# Patient Record
(demographics unavailable — no encounter records)

---

## 2025-02-21 NOTE — HISTORY OF PRESENT ILLNESS
[Disease: _____________________] : Disease: [unfilled] [T: ___] : T[unfilled] [N: ___] : N[unfilled] [AJCC Stage: ____] : AJCC Stage: [unfilled] [de-identified] : 64-year-old male smoker, history of polysubstance abuse, presenting for oncology consultation for lung carcinoma -self-referred.    12/5/2024-Admitted to [facility] with drug overdose, respiratory failure. Foung lung mass on imaging (eventually left AMA).   12/5/2024 -CT chest-pulmonary neoplasm/malignancy involving the right lower lobe with intrathoracic lymph node metastasis.  Enlarged right superior and inferior right hilar lymph nodes the largest measuring 1.8 cm.  Enlarged subcarinal lymph node measuring 3.9 cm.  No left hilar lymphadenopathy or supraclavicular lymphadenopathy.  Soft tissue mass in the superior segment of the right lower lobe with occlusion of the superior segment right lower lobe bronchus measuring 7 cm.  Pleural-based calcification due to prior asbestos exposure.  Scarring in the left lower lobe.  Visualized upper abdomen unremarkable.  1/2/2025-CT guided right lower lobe lung mass biopsy-non-small cell carcinoma, favor adenocarcinoma of lung primary.  Neoplastic cells are positive for CK7, TTF-1 1 and p63.  Negative for Napsin A, chromogranin and synaptophysin.  Ki-67 40%.  PD-L1 negative (less than 1%). Insufficient tissue for NGS study.  2/5/2025-PET/CT scan- 2/14/2025-MRI Saw pulmonologist?? Patient currently   [de-identified] : NSCLC, favor Adenocarcinoma [de-identified] : PD-L1 <1%, negative

## 2025-02-21 NOTE — HISTORY OF PRESENT ILLNESS
[Disease: _____________________] : Disease: [unfilled] [T: ___] : T[unfilled] [N: ___] : N[unfilled] [AJCC Stage: ____] : AJCC Stage: [unfilled] [de-identified] : 64-year-old male smoker, history of polysubstance abuse, presenting for oncology consultation for lung carcinoma -self-referred.    12/5/2024-Admitted to [facility] with drug overdose, respiratory failure. Foung lung mass on imaging (eventually left AMA).   12/5/2024 -CT chest-pulmonary neoplasm/malignancy involving the right lower lobe with intrathoracic lymph node metastasis.  Enlarged right superior and inferior right hilar lymph nodes the largest measuring 1.8 cm.  Enlarged subcarinal lymph node measuring 3.9 cm.  No left hilar lymphadenopathy or supraclavicular lymphadenopathy.  Soft tissue mass in the superior segment of the right lower lobe with occlusion of the superior segment right lower lobe bronchus measuring 7 cm.  Pleural-based calcification due to prior asbestos exposure.  Scarring in the left lower lobe.  Visualized upper abdomen unremarkable.  1/2/2025-CT guided right lower lobe lung mass biopsy-non-small cell carcinoma, favor adenocarcinoma of lung primary.  Neoplastic cells are positive for CK7, TTF-1 1 and p63.  Negative for Napsin A, chromogranin and synaptophysin.  Ki-67 40%.  PD-L1 negative (less than 1%). Insufficient tissue for NGS study.  2/5/2025-PET/CT scan- 2/14/2025-MRI Saw pulmonologist?? Patient currently   [de-identified] : NSCLC, favor Adenocarcinoma [de-identified] : PD-L1 <1%, negative

## 2025-02-21 NOTE — ASSESSMENT
[FreeTextEntry1] : [facility] records, CT chest report, lung biopsy pathology report reviewed. Clinically Stage IIIB (T3N2) TIGHT lung NSCLC, favor adenocarcinoma. -- With information we currently have, reviewed diagnosis and treatment options. --Discussed roles of surgery/radiation/systemic therapy in lung cancer management. --PET/CT scan pending --Brain MRI pending --Obtain lab work --Thoracic surgical referral-to consider further mediastinal evaluation --Radiation oncology consultation --Obtain NGS-if insufficient tissue from recent biopsy, to consider liquid biopsy and/or attempting testing on further tissue which may be obtained -- With the information we currently have, discussed options such as perioperative systemic therapy with reevaluation for surgery following neoadjuvant treatment, versus definitive concurrent chemoradiation if patient is deemed not to be a surgical candidate.  Expressed my concern regarding his polysubstance abuse with potential complications including death if ongoing use while we are instituting cancer treatment.  Emphasized importance of compliance with follow-up and recommendations.  Patient -- Palliative care referral-to assist with symptom management during course considering patient's history of drug abuse as well. --have pathology slides reviewed by [facility]  Patient was given the opportunity to ask questions. His questions were answered to his apparent satisfaction at this time.  -->RTO post thoracic surgery and radiation oncology consultations/input.

## 2025-02-21 NOTE — ASSESSMENT
[FreeTextEntry1] : [facility] records, CT chest report, lung biopsy pathology report reviewed. Clinically Stage IIIB (T3N2) TIGHT lung NSCLC, favor adenocarcinoma. -- With information we currently have, reviewed diagnosis and treatment options. --Discussed roles of surgery/radiation/systemic therapy in lung cancer management. --PET/CT scan pending --Brain MRI pending --Obtain lab work --Thoracic surgical referral-to consider further mediastinal evaluation --Radiation oncology consultation --Obtain NGS-if insufficient tissue from recent biopsy, to consider liquid biopsy and/or attempting testing on further tissue which may be obtained -- With the information we currently have, discussed options such as perioperative systemic therapy with reevaluation for surgery following neoadjuvant treatment, versus definitive concurrent chemoradiation if patient is deemed not to be a surgical candidate.  Expressed my concern regarding his polysubstance abuse with potential complications including death if ongoing use while we are instituting cancer treatment.  Emphasized importance of compliance with follow-up and recommendations.  Patient -- Palliative care referral-to assist with symptom management during course considering patient's history of drug abuse as well. --have pathology slides reviewed by [facility]  Patient was given the opportunity to ask questions. His questions were answered to his apparent satisfaction at this time.  -->RTO post thoracic surgery and radiation oncology consultations/input.   134 based on 5'4" if accurate for BMI of 23/other (specify)

## 2025-03-05 NOTE — ASSESSMENT
[FreeTextEntry1] : [facility] records, CT chest report, lung biopsy pathology reports reviewed. Clinically Stage IIIC (T4N3) RIGHT lung NSCLC, poorly differentiated carcinoma, favor adenocarcinoma with focal squamous differentiation. -- With information we currently have, reviewed diagnosis and treatment options. --Discussed roles of surgery/radiation/systemic therapy in lung cancer management. --Brain MRI pending --Obtain lab work --need path slides reviewed by our pathologists --Radiation oncology consultation --Obtain NGS-t/c right SCLN biopsy to confirm N3 disease-as reportedly insufficient tissue from recent biopsy for NGS, obtain additional tissue for NGS if possible. If SCLN biopsy negative, t/c thoracic surgery referral. -- With the information we currently have, with clinically locally advanced disease, would favor combined chemotherapy/radiation here.  With focal squamous differentiation reported on one of the path reviews from outside hospital, would consider beginning with Taxol/Carboplatin weekly with radiation.  Potential side effects of chemotherapy explained including but not limited to alopecia, allergic/hypersensitivity reaction, neuropathy, cardiac toxicity, cytopenias.  As per NCCN guidelines may consider consolidation therapy for patients with unresectable stage III non-small cell lung cancer, PS 0-1 and no disease progression after definitive concurrent chemoradiation.  Would consider durvalumab for up to 12 months (pending EGFR status).  *Expressed my concern regarding his polysubstance abuse with potential complications including death if ongoing use while we are instituting cancer treatment.  Emphasized importance of compliance with follow-up and recommendations.  Patient  -- Palliative care referral-to assist with symptom management during course considering patient's history of drug abuse as well.  Patient was given the opportunity to ask questions. His questions were answered to his apparent satisfaction at this time.  -->RTO 2 weeks (post SCLN biopsy).

## 2025-03-05 NOTE — HISTORY OF PRESENT ILLNESS
[T: ___] : T[unfilled] [de-identified] : 64-year-old male smoker, history of polysubstance abuse, presenting for oncology consultation for lung carcinoma -self-referred.    12/5/2024-Admitted to [facility] with drug overdose, respiratory failure. Foung lung mass on imaging (eventually left AMA).   12/5/2024 -CT chest-pulmonary neoplasm/malignancy involving the right lower lobe with intrathoracic lymph node metastasis.  Enlarged right superior and inferior right hilar lymph nodes the largest measuring 1.8 cm.  Enlarged subcarinal lymph node measuring 3.9 cm.  No left hilar lymphadenopathy or supraclavicular lymphadenopathy.  Soft tissue mass in the superior segment of the right lower lobe with occlusion of the superior segment right lower lobe bronchus measuring 7 cm.  Pleural-based calcification due to prior asbestos exposure.  Scarring in the left lower lobe.  Visualized upper abdomen unremarkable.  1/2/2025-[facility]-CT guided right lower lobe lung mass biopsy-non-small cell carcinoma, favor adenocarcinoma of lung primary.  Neoplastic cells are positive for CK7, TTF-1 1 and p63.  Negative for Napsin A, chromogranin and synaptophysin.  Ki-67 40%.  PD-L1 negative (less than 1%). Insufficient tissue for NGS study. 2/5/2025-Slide review by [facility]-poorly differentiated non-small cell carcinoma favor lung primary.  Tumor cells positive for CK7, TTF-1 and p63.  Negative for Napsin A, chromogranin and synaptophysin.  Ki-67 of 40%.  Taken together the morphologic and immunophenotypic features favor a lung primary poorly differentiated adenocarcinoma with focal squamous differentiation.  2/5/2025-PET/CT scan-large hypermetabolic mass (11.5 x 4.7 cm) in the medial right lower lobe of the lung which may be pleural in origin, most consistent with malignancy.  Additional small pulmonary nodules below the size resolution of PET imaging-representative nodules include a 0.3 cm right middle lobe pulmonary nodule and a 0.3 cm nodule along the right major fissure.  Dependent changes are noted in the left lung..  Hypermetabolic right supraclavicular, mediastinal and right hilar lymphadenopathy most consistent with metastatic jeison disease.  Otherwise no abnormal hypermetabolic activity to suggest additional site malignancy.  Cholelithiasis.  Intra and extrahepatic biliary ductal dilatation to the head of the pancreas without evidence of obstructing lesion.  MR/MRCP of the abdomen with and without IV contrast may be helpful in further evaluation. 2/14/2025-MRI brain-pending  Saw pulmonologist?? Patient currently   [de-identified] : NSCLC, favor Adenocarcinoma [de-identified] : PD-L1 <1%, negative